# Patient Record
Sex: MALE
[De-identification: names, ages, dates, MRNs, and addresses within clinical notes are randomized per-mention and may not be internally consistent; named-entity substitution may affect disease eponyms.]

---

## 2021-03-01 ENCOUNTER — TRANSCRIPTION ENCOUNTER (OUTPATIENT)
Age: 56
End: 2021-03-01

## 2021-03-01 ENCOUNTER — APPOINTMENT (OUTPATIENT)
Dept: ORTHOPEDIC SURGERY | Facility: CLINIC | Age: 56
End: 2021-03-01
Payer: COMMERCIAL

## 2021-03-01 DIAGNOSIS — S63.635A SPRAIN OF INTERPHALANGEAL JOINT OF LEFT RING FINGER, INITIAL ENCOUNTER: ICD-10-CM

## 2021-03-01 DIAGNOSIS — M79.645 PAIN IN LEFT FINGER(S): ICD-10-CM

## 2021-03-01 PROCEDURE — 73140 X-RAY EXAM OF FINGER(S): CPT | Mod: F3

## 2021-03-01 PROCEDURE — 99072 ADDL SUPL MATRL&STAF TM PHE: CPT

## 2021-03-01 PROCEDURE — 99203 OFFICE O/P NEW LOW 30 MIN: CPT

## 2021-03-01 NOTE — HISTORY OF PRESENT ILLNESS
[de-identified] : Pt is a 55 y/o male with left ring finger injury.  He was walking his dog yesterday and the dog pulled him.  The leash got stuck on his left ring finger and he felt pain.  The pain was not severe and he did not go to the ED or Urgent care right away.  The pain and swelling progressed overnight.  He went to [facility] ED where xrays were taken.  At Urgent Care, he was told that he fractured the finger but official read by Dr. Negron states that there is no fracture or dislocation.  He went to a jeweler where his ring was cut off after the xray.  He states that the pain has worsened today.  It is primarily in the PIP joint. Patient unable to take Advil due to ulcerative colitis.

## 2021-03-01 NOTE — PHYSICAL EXAM
[de-identified] : Patient is WDWN, alert, and in no acute distress. Breathing is unlabored. He is grossly oriented to person, place, \par and time. \par \par Left Hand:\par Left ring finger is swollen.  [de-identified] : AP, lateral and oblique views of the left hand were obtained today and revealed the left ring finger has no acute fracture.

## 2021-03-01 NOTE — DISCUSSION/SUMMARY
[de-identified] : The underlying pathophysiology was reviewed with the patient. XR films were reviewed with the patient. Discussed at length the nature of the patient’s condition. His left ring finger symptoms are secondary to a PIP sprain.\par \par Treatment options were discussed including; observation and Tylenol.\par \par Ramakrishna-taping middle finger to ring finger.\par Begin hand exercises to allow for movement of fingers.\par Soak in Epsom salts and warm water.\par \par Patient can continue activities as tolerated. All questions answered, understanding verbalized. Patient in agreement with plan of care. No follow up needed.

## 2022-07-04 ENCOUNTER — NON-APPOINTMENT (OUTPATIENT)
Age: 57
End: 2022-07-04